# Patient Record
Sex: MALE | Race: WHITE | Employment: OTHER | URBAN - METROPOLITAN AREA
[De-identification: names, ages, dates, MRNs, and addresses within clinical notes are randomized per-mention and may not be internally consistent; named-entity substitution may affect disease eponyms.]

---

## 2018-03-19 ENCOUNTER — HOSPITAL ENCOUNTER (OUTPATIENT)
Dept: PREADMISSION TESTING | Age: 73
Discharge: HOME | End: 2018-03-19
Payer: MEDICARE

## 2018-03-19 VITALS
OXYGEN SATURATION: 97 % | HEIGHT: 72 IN | RESPIRATION RATE: 20 BRPM | WEIGHT: 186.38 LBS | SYSTOLIC BLOOD PRESSURE: 153 MMHG | HEART RATE: 58 BPM | TEMPERATURE: 98.3 F | BODY MASS INDEX: 25.24 KG/M2 | DIASTOLIC BLOOD PRESSURE: 84 MMHG

## 2018-03-19 LAB
ANION GAP SERPL CALC-SCNC: 7 MMOL/L (ref 5–15)
BASOPHILS # BLD: 0 K/UL (ref 0–0.1)
BASOPHILS NFR BLD: 0 % (ref 0–1)
BUN SERPL-MCNC: 16 MG/DL (ref 6–20)
BUN/CREAT SERPL: 20 (ref 12–20)
CALCIUM SERPL-MCNC: 9.6 MG/DL (ref 8.5–10.1)
CHLORIDE SERPL-SCNC: 104 MMOL/L (ref 97–108)
CO2 SERPL-SCNC: 33 MMOL/L (ref 21–32)
CREAT SERPL-MCNC: 0.81 MG/DL (ref 0.7–1.3)
DIFFERENTIAL METHOD BLD: NORMAL
EOSINOPHIL # BLD: 0.3 K/UL (ref 0–0.4)
EOSINOPHIL NFR BLD: 4 % (ref 0–7)
ERYTHROCYTE [DISTWIDTH] IN BLOOD BY AUTOMATED COUNT: 13.9 % (ref 11.5–14.5)
GLUCOSE SERPL-MCNC: 94 MG/DL (ref 65–100)
HCT VFR BLD AUTO: 43.4 % (ref 36.6–50.3)
HGB BLD-MCNC: 14.7 G/DL (ref 12.1–17)
IMM GRANULOCYTES # BLD: 0 K/UL (ref 0–0.04)
IMM GRANULOCYTES NFR BLD AUTO: 0 % (ref 0–0.5)
LYMPHOCYTES # BLD: 2.4 K/UL (ref 0.8–3.5)
LYMPHOCYTES NFR BLD: 35 % (ref 12–49)
MCH RBC QN AUTO: 32.9 PG (ref 26–34)
MCHC RBC AUTO-ENTMCNC: 33.9 G/DL (ref 30–36.5)
MCV RBC AUTO: 97.1 FL (ref 80–99)
MONOCYTES # BLD: 0.7 K/UL (ref 0–1)
MONOCYTES NFR BLD: 10 % (ref 5–13)
NEUTS SEG # BLD: 3.6 K/UL (ref 1.8–8)
NEUTS SEG NFR BLD: 51 % (ref 32–75)
NRBC # BLD: 0 K/UL (ref 0–0.01)
NRBC BLD-RTO: 0 PER 100 WBC
PLATELET # BLD AUTO: 215 K/UL (ref 150–400)
PMV BLD AUTO: 10 FL (ref 8.9–12.9)
POTASSIUM SERPL-SCNC: 4.3 MMOL/L (ref 3.5–5.1)
RBC # BLD AUTO: 4.47 M/UL (ref 4.1–5.7)
SODIUM SERPL-SCNC: 144 MMOL/L (ref 136–145)
WBC # BLD AUTO: 7 K/UL (ref 4.1–11.1)

## 2018-03-19 PROCEDURE — 80048 BASIC METABOLIC PNL TOTAL CA: CPT | Performed by: SURGERY

## 2018-03-19 PROCEDURE — 36415 COLL VENOUS BLD VENIPUNCTURE: CPT | Performed by: SURGERY

## 2018-03-19 PROCEDURE — 93005 ELECTROCARDIOGRAM TRACING: CPT

## 2018-03-19 PROCEDURE — 85025 COMPLETE CBC W/AUTO DIFF WBC: CPT | Performed by: SURGERY

## 2018-03-19 RX ORDER — ALENDRONATE SODIUM 35 MG/1
35 TABLET ORAL
COMMUNITY

## 2018-03-19 RX ORDER — GLUCOSAMINE SULFATE 1500 MG
POWDER IN PACKET (EA) ORAL DAILY
COMMUNITY
End: 2018-03-19

## 2018-03-19 RX ORDER — CALCIUM CARBONATE/VITAMIN D3 500 MG-10
TABLET ORAL DAILY
COMMUNITY

## 2018-03-19 RX ORDER — NEBIVOLOL 20 MG/1
TABLET ORAL DAILY
COMMUNITY

## 2018-03-19 RX ORDER — LANOLIN ALCOHOL/MO/W.PET/CERES
1000 CREAM (GRAM) TOPICAL DAILY
COMMUNITY
End: 2018-03-19

## 2018-03-19 RX ORDER — CYANOCOBALAMIN 1000 UG/ML
1000 INJECTION, SOLUTION INTRAMUSCULAR; SUBCUTANEOUS ONCE
COMMUNITY

## 2018-03-19 NOTE — PERIOP NOTES
[facility]  Annmarie Gaines 75, [address]    MAIN OR [phone]    MAIN PRE OP [phone]    AMBULATORY PRE OP [phone]    PRE-ADMISSION TESTING [phone]       Surgery Date:          Is surgery arrival time given by surgeon? NO  If NO, [facility] staff will call you between 3 and 7pm the day before your surgery with your arrival time. (If your surgery is on a Monday, we will call you the Friday before.)    Call [phone] after 7pm Monday-Friday if you did not receive your arrival time.     Answers to Common Questions   When You  Arrive   Arrive at the 2nd [address] [facility] on the day of your surgery  Have your insurance card, photo ID, and any copayment (if needed)     Food   and   Drink   NO food or drink after midnight the night before surgery    This means NO water, gum, mints, coffee, juice, etc.  No alcohol (beer, wine, liquor) 24 hours before and after surgery     Medicine to   TAKE   Morning of Surgery   MEDICATIONS TO TAKE THE MORNING OF SURGERY WITH A SIP OF WATER:    Fosamax, Omeprazole, Bystolic     Medicine  To  STOP   FOR PAIN   NO Aspirin for pain    NO Non-Steroidal Anti-Inflammatory Drugs (NSAIDs:   for example, Ibuprofen (Advil, Motrin), Naproxen (Aleve)   STOP herbal supplements and vitamins 1 week before surgery   You can take Tylenol  follow instructions on the bottle     Blood  Thinners    If you take Aspirin, Plavix, Coumadin, blood-thinning or anti-clot medicine, talk to your surgeon and/or follow the instructions from the doctor who told you to take that medicine     Clothing  Jewelry  Valuables  Bathing     CLOTHING   Wear loose, comfortable clothes   Wear glasses instead of contacts   Leave money, jewelry and valuables at home   No make-up, particularly mascara, the day of surgery   REMOVE ALL piercings, rings, and jewelry - leave at home   Wear hair loose or down; no pony-tails, buns, or metal hair clips    BATHING   Follow all special bath instructions (for total joint replacement, spine and bowel surgeries.)   If you shower the morning of surgery, please do not apply any lotions, powders, or deodorants afterwards. Do not shave or trim anywhere 24 hours before surgery. Going Home  or  Spending the Night    SAME-DAY SURGERY: You must have a responsible adult drive you home and stay with you 24 hours after surgery   ADMITS: If your doctor is keeping you into the hospital after surgery, leave personal belongings/luggage in your car until you have a hospital room number. Hospital discharge time is 12 noon  Drivers must be here before 12 noon unless you are told differently         Follow all instructions so your surgery wont be cancelled. Please, be on time. If a situation occurs and you are delayed the day of surgery, call [phone] or [phone]. If your physical condition changes (like a fever, cold, flu, etc.) call your surgeon as soon as possible. The Preadmission Testing staff can be reached at [phone]. OTHER SPECIAL INSTRUCTIONS:  Free  Parking    The patient was contacted  in person. He  verbalize  understanding of all instructions and does not  need reinforcement.

## 2018-03-20 LAB
ATRIAL RATE: 54 BPM
CALCULATED P AXIS, ECG09: 73 DEGREES
CALCULATED R AXIS, ECG10: -44 DEGREES
CALCULATED T AXIS, ECG11: 24 DEGREES
DIAGNOSIS, 93000: NORMAL
P-R INTERVAL, ECG05: 206 MS
Q-T INTERVAL, ECG07: 426 MS
QRS DURATION, ECG06: 106 MS
QTC CALCULATION (BEZET), ECG08: 403 MS
VENTRICULAR RATE, ECG03: 54 BPM

## 2018-03-21 ENCOUNTER — ANESTHESIA EVENT (OUTPATIENT)
Dept: SURGERY | Age: 73
End: 2018-03-21
Payer: MEDICARE

## 2018-03-21 RX ORDER — SODIUM CHLORIDE, SODIUM LACTATE, POTASSIUM CHLORIDE, CALCIUM CHLORIDE 600; 310; 30; 20 MG/100ML; MG/100ML; MG/100ML; MG/100ML
125 INJECTION, SOLUTION INTRAVENOUS CONTINUOUS
Status: CANCELLED | OUTPATIENT
Start: 2018-03-21

## 2018-03-21 RX ORDER — HYDROMORPHONE HYDROCHLORIDE 1 MG/ML
.25-1 INJECTION, SOLUTION INTRAMUSCULAR; INTRAVENOUS; SUBCUTANEOUS
Status: CANCELLED | OUTPATIENT
Start: 2018-03-21

## 2018-03-21 RX ORDER — DIPHENHYDRAMINE HYDROCHLORIDE 50 MG/ML
12.5 INJECTION, SOLUTION INTRAMUSCULAR; INTRAVENOUS AS NEEDED
Status: CANCELLED | OUTPATIENT
Start: 2018-03-21 | End: 2018-03-21

## 2018-03-21 RX ORDER — SODIUM CHLORIDE 0.9 % (FLUSH) 0.9 %
5-10 SYRINGE (ML) INJECTION AS NEEDED
Status: CANCELLED | OUTPATIENT
Start: 2018-03-21

## 2018-03-22 ENCOUNTER — HOSPITAL ENCOUNTER (OUTPATIENT)
Age: 73
Setting detail: OUTPATIENT SURGERY
Discharge: HOME | End: 2018-03-22
Attending: SURGERY | Admitting: SURGERY
Payer: MEDICARE

## 2018-03-22 ENCOUNTER — ANESTHESIA (OUTPATIENT)
Dept: SURGERY | Age: 73
End: 2018-03-22
Payer: MEDICARE

## 2018-03-22 VITALS
TEMPERATURE: 99 F | HEART RATE: 62 BPM | DIASTOLIC BLOOD PRESSURE: 90 MMHG | OXYGEN SATURATION: 99 % | SYSTOLIC BLOOD PRESSURE: 146 MMHG | RESPIRATION RATE: 18 BRPM

## 2018-03-22 PROCEDURE — 74011250636 HC RX REV CODE- 250/636: Performed by: ANESTHESIOLOGY

## 2018-03-22 PROCEDURE — 77030020782 HC GWN BAIR PAWS FLX 3M -B

## 2018-03-22 RX ORDER — SODIUM CHLORIDE 0.9 % (FLUSH) 0.9 %
5-10 SYRINGE (ML) INJECTION AS NEEDED
Status: DISCONTINUED | OUTPATIENT
Start: 2018-03-22 | End: 2018-03-22 | Stop reason: HOSPADM

## 2018-03-22 RX ORDER — CEFAZOLIN SODIUM/WATER 2 G/20 ML
2 SYRINGE (ML) INTRAVENOUS ONCE
Status: DISCONTINUED | OUTPATIENT
Start: 2018-03-22 | End: 2018-03-22 | Stop reason: HOSPADM

## 2018-03-22 RX ORDER — ACETAMINOPHEN 325 MG/1
650 TABLET ORAL
COMMUNITY
End: 2018-03-23

## 2018-03-22 RX ORDER — FLUMAZENIL 0.1 MG/ML
0.2 INJECTION INTRAVENOUS
Status: DISCONTINUED | OUTPATIENT
Start: 2018-03-22 | End: 2018-03-22 | Stop reason: HOSPADM

## 2018-03-22 RX ORDER — SODIUM CHLORIDE, SODIUM LACTATE, POTASSIUM CHLORIDE, CALCIUM CHLORIDE 600; 310; 30; 20 MG/100ML; MG/100ML; MG/100ML; MG/100ML
125 INJECTION, SOLUTION INTRAVENOUS CONTINUOUS
Status: DISCONTINUED | OUTPATIENT
Start: 2018-03-22 | End: 2018-03-22 | Stop reason: HOSPADM

## 2018-03-22 RX ORDER — SODIUM CHLORIDE 0.9 % (FLUSH) 0.9 %
5-10 SYRINGE (ML) INJECTION EVERY 8 HOURS
Status: DISCONTINUED | OUTPATIENT
Start: 2018-03-22 | End: 2018-03-22 | Stop reason: HOSPADM

## 2018-03-22 RX ORDER — NALOXONE HYDROCHLORIDE 0.4 MG/ML
0.2 INJECTION, SOLUTION INTRAMUSCULAR; INTRAVENOUS; SUBCUTANEOUS
Status: DISCONTINUED | OUTPATIENT
Start: 2018-03-22 | End: 2018-03-22 | Stop reason: HOSPADM

## 2018-03-22 RX ORDER — LIDOCAINE HYDROCHLORIDE 10 MG/ML
0.1 INJECTION, SOLUTION EPIDURAL; INFILTRATION; INTRACAUDAL; PERINEURAL AS NEEDED
Status: DISCONTINUED | OUTPATIENT
Start: 2018-03-22 | End: 2018-03-22 | Stop reason: HOSPADM

## 2018-03-22 RX ADMIN — SODIUM CHLORIDE, SODIUM LACTATE, POTASSIUM CHLORIDE, AND CALCIUM CHLORIDE 125 ML/HR: 600; 310; 30; 20 INJECTION, SOLUTION INTRAVENOUS at 12:22

## 2018-03-22 NOTE — ANESTHESIA PREPROCEDURE EVALUATION
Anesthetic History   No history of anesthetic complications            Review of Systems / Medical History  Patient summary reviewed, nursing notes reviewed and pertinent labs reviewed    Pulmonary  Within defined limits                 Neuro/Psych   Within defined limits           Cardiovascular    Hypertension              Exercise tolerance: >4 METS  Comments: nebivolol (BYSTOLIC) 20 mg tablet   5/09/6312 at 0630       GI/Hepatic/Renal     GERD           Endo/Other        Cancer (colon)     Other Findings            Physical Exam    Airway  Mallampati: II  TM Distance: 4 - 6 cm  Neck ROM: normal range of motion   Mouth opening: Normal     Cardiovascular  Regular rate and rhythm,  S1 and S2 normal,  no murmur, click, rub, or gallop  Rhythm: regular  Rate: normal         Dental    Dentition: Full upper dentures     Pulmonary  Breath sounds clear to auscultation               Abdominal  GI exam deferred       Other Findings            Anesthetic Plan    ASA: 2  Anesthesia type: general          Induction: Intravenous  Anesthetic plan and risks discussed with: Patient

## 2018-03-22 NOTE — PERIOP NOTES
Pt. Cancelled per Dr. Daina Chau and added on for tomorrow due to long emergent case. Pt. Given instructions, time of arrival and NPO status.

## 2018-03-23 ENCOUNTER — HOSPITAL ENCOUNTER (OUTPATIENT)
Age: 73
Setting detail: OUTPATIENT SURGERY
Discharge: HOME | End: 2018-03-23
Attending: SURGERY | Admitting: SURGERY
Payer: MEDICARE

## 2018-03-23 VITALS
HEART RATE: 75 BPM | TEMPERATURE: 98 F | OXYGEN SATURATION: 98 % | WEIGHT: 186.29 LBS | RESPIRATION RATE: 16 BRPM | SYSTOLIC BLOOD PRESSURE: 165 MMHG | BODY MASS INDEX: 25.23 KG/M2 | HEIGHT: 72 IN | DIASTOLIC BLOOD PRESSURE: 86 MMHG

## 2018-03-23 DIAGNOSIS — K40.91 RECURRENT RIGHT INGUINAL HERNIA: Primary | ICD-10-CM

## 2018-03-23 PROCEDURE — 76210000020 HC REC RM PH II FIRST 0.5 HR: Performed by: SURGERY

## 2018-03-23 PROCEDURE — 74011250636 HC RX REV CODE- 250/636: Performed by: ANESTHESIOLOGY

## 2018-03-23 PROCEDURE — 76210000006 HC OR PH I REC 0.5 TO 1 HR: Performed by: SURGERY

## 2018-03-23 PROCEDURE — 77030002966 HC SUT PDS J&J -A: Performed by: SURGERY

## 2018-03-23 PROCEDURE — 74011250636 HC RX REV CODE- 250/636

## 2018-03-23 PROCEDURE — 74011000250 HC RX REV CODE- 250

## 2018-03-23 PROCEDURE — 74011000250 HC RX REV CODE- 250: Performed by: SURGERY

## 2018-03-23 PROCEDURE — 77030020782 HC GWN BAIR PAWS FLX 3M -B

## 2018-03-23 PROCEDURE — 77030031139 HC SUT VCRL2 J&J -A: Performed by: SURGERY

## 2018-03-23 PROCEDURE — 76010000153 HC OR TIME 1.5 TO 2 HR: Performed by: SURGERY

## 2018-03-23 PROCEDURE — 77030011640 HC PAD GRND REM COVD -A: Performed by: SURGERY

## 2018-03-23 PROCEDURE — 77030020143 HC AIRWY LARYN INTUB CGAS -A: Performed by: ANESTHESIOLOGY

## 2018-03-23 PROCEDURE — C1781 MESH (IMPLANTABLE): HCPCS | Performed by: SURGERY

## 2018-03-23 PROCEDURE — 76060000034 HC ANESTHESIA 1.5 TO 2 HR: Performed by: SURGERY

## 2018-03-23 PROCEDURE — 77030012406 HC DRN WND PENRS BARD -A: Performed by: SURGERY

## 2018-03-23 PROCEDURE — 74011250636 HC RX REV CODE- 250/636: Performed by: SURGERY

## 2018-03-23 PROCEDURE — 77030010507 HC ADH SKN DERMBND J&J -B: Performed by: SURGERY

## 2018-03-23 PROCEDURE — 77030018836 HC SOL IRR NACL ICUM -A: Performed by: SURGERY

## 2018-03-23 PROCEDURE — 77030032490 HC SLV COMPR SCD KNE COVD -B: Performed by: SURGERY

## 2018-03-23 PROCEDURE — 77030002933 HC SUT MCRYL J&J -A: Performed by: SURGERY

## 2018-03-23 PROCEDURE — C9290 INJ, BUPIVACAINE LIPOSOME: HCPCS | Performed by: SURGERY

## 2018-03-23 DEVICE — MESH HERN W7.5XL15CM INGUINAL POLYPR SYN FLAT L PORE MFIL: Type: IMPLANTABLE DEVICE | Site: GROIN | Status: FUNCTIONAL

## 2018-03-23 RX ORDER — SODIUM CHLORIDE, SODIUM LACTATE, POTASSIUM CHLORIDE, CALCIUM CHLORIDE 600; 310; 30; 20 MG/100ML; MG/100ML; MG/100ML; MG/100ML
125 INJECTION, SOLUTION INTRAVENOUS CONTINUOUS
Status: DISCONTINUED | OUTPATIENT
Start: 2018-03-23 | End: 2018-03-23 | Stop reason: HOSPADM

## 2018-03-23 RX ORDER — MIDAZOLAM HYDROCHLORIDE 1 MG/ML
INJECTION, SOLUTION INTRAMUSCULAR; INTRAVENOUS AS NEEDED
Status: DISCONTINUED | OUTPATIENT
Start: 2018-03-23 | End: 2018-03-23 | Stop reason: HOSPADM

## 2018-03-23 RX ORDER — LIDOCAINE HYDROCHLORIDE 20 MG/ML
INJECTION, SOLUTION EPIDURAL; INFILTRATION; INTRACAUDAL; PERINEURAL AS NEEDED
Status: DISCONTINUED | OUTPATIENT
Start: 2018-03-23 | End: 2018-03-23 | Stop reason: HOSPADM

## 2018-03-23 RX ORDER — HYDROMORPHONE HYDROCHLORIDE 1 MG/ML
.25-1 INJECTION, SOLUTION INTRAMUSCULAR; INTRAVENOUS; SUBCUTANEOUS
Status: DISCONTINUED | OUTPATIENT
Start: 2018-03-23 | End: 2018-03-23 | Stop reason: HOSPADM

## 2018-03-23 RX ORDER — EPHEDRINE SULFATE 50 MG/ML
INJECTION, SOLUTION INTRAVENOUS AS NEEDED
Status: DISCONTINUED | OUTPATIENT
Start: 2018-03-23 | End: 2018-03-23 | Stop reason: HOSPADM

## 2018-03-23 RX ORDER — DEXAMETHASONE SODIUM PHOSPHATE 4 MG/ML
INJECTION, SOLUTION INTRA-ARTICULAR; INTRALESIONAL; INTRAMUSCULAR; INTRAVENOUS; SOFT TISSUE AS NEEDED
Status: DISCONTINUED | OUTPATIENT
Start: 2018-03-23 | End: 2018-03-23 | Stop reason: HOSPADM

## 2018-03-23 RX ORDER — CEFAZOLIN SODIUM/WATER 2 G/20 ML
2 SYRINGE (ML) INTRAVENOUS ONCE
Status: COMPLETED | OUTPATIENT
Start: 2018-03-23 | End: 2018-03-23

## 2018-03-23 RX ORDER — SODIUM CHLORIDE 0.9 % (FLUSH) 0.9 %
5-10 SYRINGE (ML) INJECTION AS NEEDED
Status: DISCONTINUED | OUTPATIENT
Start: 2018-03-23 | End: 2018-03-23 | Stop reason: HOSPADM

## 2018-03-23 RX ORDER — PROPOFOL 10 MG/ML
INJECTION, EMULSION INTRAVENOUS AS NEEDED
Status: DISCONTINUED | OUTPATIENT
Start: 2018-03-23 | End: 2018-03-23 | Stop reason: HOSPADM

## 2018-03-23 RX ORDER — OXYCODONE AND ACETAMINOPHEN 5; 325 MG/1; MG/1
1-2 TABLET ORAL
Qty: 25 TAB | Refills: 0 | Status: SHIPPED | OUTPATIENT
Start: 2018-03-23

## 2018-03-23 RX ORDER — DIPHENHYDRAMINE HYDROCHLORIDE 50 MG/ML
12.5 INJECTION, SOLUTION INTRAMUSCULAR; INTRAVENOUS AS NEEDED
Status: DISCONTINUED | OUTPATIENT
Start: 2018-03-23 | End: 2018-03-23 | Stop reason: HOSPADM

## 2018-03-23 RX ORDER — ONDANSETRON 2 MG/ML
INJECTION INTRAMUSCULAR; INTRAVENOUS AS NEEDED
Status: DISCONTINUED | OUTPATIENT
Start: 2018-03-23 | End: 2018-03-23 | Stop reason: HOSPADM

## 2018-03-23 RX ORDER — FENTANYL CITRATE 50 UG/ML
INJECTION, SOLUTION INTRAMUSCULAR; INTRAVENOUS AS NEEDED
Status: DISCONTINUED | OUTPATIENT
Start: 2018-03-23 | End: 2018-03-23 | Stop reason: HOSPADM

## 2018-03-23 RX ADMIN — EPHEDRINE SULFATE 5 MG: 50 INJECTION, SOLUTION INTRAVENOUS at 17:48

## 2018-03-23 RX ADMIN — FENTANYL CITRATE 50 MCG: 50 INJECTION, SOLUTION INTRAMUSCULAR; INTRAVENOUS at 17:30

## 2018-03-23 RX ADMIN — LIDOCAINE HYDROCHLORIDE 50 MG: 20 INJECTION, SOLUTION EPIDURAL; INFILTRATION; INTRACAUDAL; PERINEURAL at 17:15

## 2018-03-23 RX ADMIN — FENTANYL CITRATE 25 MCG: 50 INJECTION, SOLUTION INTRAMUSCULAR; INTRAVENOUS at 18:00

## 2018-03-23 RX ADMIN — SODIUM CHLORIDE, SODIUM LACTATE, POTASSIUM CHLORIDE, AND CALCIUM CHLORIDE: 600; 310; 30; 20 INJECTION, SOLUTION INTRAVENOUS at 18:06

## 2018-03-23 RX ADMIN — Medication 2 G: at 17:21

## 2018-03-23 RX ADMIN — MIDAZOLAM HYDROCHLORIDE 2 MG: 1 INJECTION, SOLUTION INTRAMUSCULAR; INTRAVENOUS at 17:10

## 2018-03-23 RX ADMIN — FENTANYL CITRATE 50 MCG: 50 INJECTION, SOLUTION INTRAMUSCULAR; INTRAVENOUS at 17:15

## 2018-03-23 RX ADMIN — ONDANSETRON 4 MG: 2 INJECTION INTRAMUSCULAR; INTRAVENOUS at 17:23

## 2018-03-23 RX ADMIN — EPHEDRINE SULFATE 15 MG: 50 INJECTION, SOLUTION INTRAVENOUS at 17:38

## 2018-03-23 RX ADMIN — PROPOFOL 120 MG: 10 INJECTION, EMULSION INTRAVENOUS at 17:15

## 2018-03-23 RX ADMIN — FENTANYL CITRATE 25 MCG: 50 INJECTION, SOLUTION INTRAMUSCULAR; INTRAVENOUS at 18:18

## 2018-03-23 RX ADMIN — SODIUM CHLORIDE, SODIUM LACTATE, POTASSIUM CHLORIDE, AND CALCIUM CHLORIDE 125 ML/HR: 600; 310; 30; 20 INJECTION, SOLUTION INTRAVENOUS at 13:43

## 2018-03-23 RX ADMIN — DEXAMETHASONE SODIUM PHOSPHATE 4 MG: 4 INJECTION, SOLUTION INTRA-ARTICULAR; INTRALESIONAL; INTRAMUSCULAR; INTRAVENOUS; SOFT TISSUE at 17:24

## 2018-03-23 NOTE — PERIOP NOTES
Reviewed DC instructions with pt and wife, both verbalize understanding. Pen pad to sign instructions not working.  Pt taking po without diff; denies c/o pain, No NV

## 2018-03-23 NOTE — IP AVS SNAPSHOT
[facility] 
 
 
 [address] 
[phone] Patient: Asif Mccullough MRN: [id number] VBW:[id number] About your hospitalization You were admitted on:  March 23, 2018 You last received care in the:  [facility] You were discharged on:  March 23, 2018 Why you were hospitalized Your primary diagnosis was:  Not on File Follow-up Information Follow up With Details Comments Contact Info Sharita Zhu MD   Patient can only remember the practice name and not the physician Faith Muhammad MD In 2 weeks  [address] 
[phone] Discharge Orders None A check alana indicates which time of day the medication should be taken. My Medications START taking these medications Instructions Each Dose to Equal  
 Morning Noon Evening Bedtime  
 oxyCODONE-acetaminophen 5-325 mg per tablet Commonly known as:  PERCOCET Your last dose was: Your next dose is: Take 1-2 Tabs by mouth every four (4) hours as needed for Pain. Max Daily Amount: 12 Tabs. 1-2 Tab CONTINUE taking these medications Instructions Each Dose to Equal  
 Morning Noon Evening Bedtime  
 alendronate 35 mg tablet Commonly known as:  FOSAMAX Your last dose was: Your next dose is: Take 35 mg by mouth every seven (7) days. 35 mg  
    
   
   
   
  
 BYSTOLIC 20 mg tablet Generic drug:  nebivolol Your last dose was: Your next dose is: Take  by mouth daily. Calcium-Cholecalciferol (D3) 500 mg(1,250mg) -400 unit Tab Your last dose was: Your next dose is: Take  by mouth daily. DIOVAN -12.5 mg per tablet Generic drug:  valsartan-hydroCHLOROthiazide Your last dose was: Your next dose is: Take 1 Tab by mouth daily. 1 Tab LUPRON DEPOT (3 MONTH) IM Your last dose was: Your next dose is:    
   
   
 by IntraMUSCular route. PriLOSEC OTC 20 mg tablet Generic drug:  omeprazole Your last dose was: Your next dose is: Take 20 mg by mouth daily. 20 mg  
    
   
   
   
  
 VITAMIN B-12 1,000 mcg/mL injection Generic drug:  cyanocobalamin Your last dose was: Your next dose is:    
   
   
 1,000 mcg by IntraMUSCular route once. Takes once per month 1000 mcg STOP taking these medications TYLENOL 325 mg tablet Generic drug:  acetaminophen Where to Get Your Medications Information on where to get these meds will be given to you by the nurse or doctor. ! Ask your nurse or doctor about these medications  
  oxyCODONE-acetaminophen 5-325 mg per tablet Discharge Instructions PATIENT INSTRUCTIONS HERNIA 
 
FOLLOW-UP:  Please make an appointment with your physician in 2 week(s). Call your physician immediately if you have any fevers greater than 102.5, drainage from you wound that is not clear or looks infected, persistent bleeding, increasing abdominal pain, problems urinating, or persistent nausea/vomiting. WOUND CARE INSTRUCTIONS:  Dermabond liquid skin bandage applied. You may shower today. Do not scrape off. Apply ice pack to the incision, 15 minutes on, 15 minutes off. This will reduce swelling and pain. If clothing rubs against the wound or causes irritation and the wound is not draining you may cover it with a dry dressing during the daytime. Try to keep the wound dry and avoid ointments on the wound unless directed to do so.   If the wound becomes bright red and painful or starts to drain infected material that is not clear, please contact your physician immediately. If the wound is mildly pink and has a thick firm ridge underneath it, this is normal, and is referred to as a healing ridge. This will resolve over the next 4-6 weeks. DIET:  You may eat any foods that you can tolerate. It is a good idea to eat a high fiber diet and take in plenty of fluids to prevent constipation. If you do become constipated you may want to take a mild laxative or take ducolax tablets on a daily basis until your bowel habits are regular. Constipation can be very uncomfortable, along with straining, after recent abdominal surgery. ACTIVITY:  You are encouraged to cough and deep breath or use your incentive spirometer if you were given one, every 15-30 minutes when awake. This will help prevent respiratory complications and low grade fevers post-operatively. You may want to hug a pillow when coughing and sneezing to add additional support to the surgical area which will decrease pain during these times. You are encouraged to walk and engage in light activity for the next two weeks. You should not lift more than 20 pounds during this time frame as it could put you at increased risk for a hernia recurrence. Twenty pounds is roughly equivalent to a plastic bag of groceries. MEDICATIONS:  Try to take narcotic medications and anti-inflammatory medications, such as tylenol, ibuprofen, naprosyn, etc., with food. This will minimize stomach upset from the medication. Should you develop nausea and vomiting from the pain medication, or develop a rash, please discontinue the medication and contact your physician. You should not drive, make important decisions, or operate machinery when taking narcotic pain medication. QUESTIONS:  Please feel free to call your physician or the hospital  if you have any questions, and they will be glad to assist you. DISCHARGE SUMMARY from your Nurse The following personal items collected during your admission are returned to you:  
Dental Appliance: Dental Appliances: None Vision: Visual Aid: Glasses Hearing Aid: Hearing Aid: Bilateral, With patient Jewelry: Jewelry: None Clothing: Clothing:  (Clothing to locker) Other Valuables: Other Valuables: Eyeglasses, Other (comment) (Hearing aids given to wife) Valuables sent to safe:   
 
PATIENT INSTRUCTIONS: 
 
After general anesthesia or intravenous sedation, for 24 hours or while taking prescription Narcotics: · Limit your activities · Do not drive and operate hazardous machinery · Do not make important personal or business decisions · Do  not drink alcoholic beverages · If you have not urinated within 8 hours after discharge, please contact your surgeon on call. Report the following to your surgeon: 
· Excessive pain, swelling, redness or odor of or around the surgical area · Temperature over 100.5 · Nausea and vomiting lasting longer than 4 hours or if unable to take medications · Any signs of decreased circulation or nerve impairment to extremity: change in color, persistent  numbness, tingling, coldness or increase pain · Any questions [address] Breathing deep and coughing are very important exercises to do after surgery. Deep breathing and coughing open the little air tubes and air sacks in your lungs. You take deep breaths every day. You may not even notice - it is just something you do when you sigh or yawn. It is a natural exercise you do to keep these air passages open. After surgery, take deep breaths and cough, on purpose. Coughing and deep breathing help prevent bronchitis and pneumonia after surgery. If you had chest or belly surgery, use a pillow as a \"hug buddy\" and hold it tightly to your chest or belly when you cough. DIRECTIONS: 
6. Take 10 to 15 slow deep breaths every hour while awake. 7. Breathe in deeply, and hold it for 2 seconds. 8. Exhale slowly through puckered lips, like blowing up a balloon. 9. After every 4th or 5th deep breath, hug your pillow to your chest or belly and give a hard, deep cough. Yes, it will probably hurt. But doing this exercise is very important part of healing after surgery. Take your pain medicine to help you do this exercise without too much pain. IF YOU HAVE BEEN DIAGNOSED WITH SLEEP APNEA, PLEASE USE YOUR SLEEP APNEA DEVICE OR CPAP MACHINE WHEN YOU INTEND TO NAP AFTER TAKING PAIN MEDICATION. Ankle Pumps Ankle pumps increase the circulation of oxygenated blood to your lower extremities and decrease your risk for circulation problems such as blood clots. They also stretch the muscles, tendons and ligaments in your foot and ankle, and prevent joint contracture in the ankle and foot, especially after surgeries on the legs. It is important to do ankle pump exercises regularly after surgery because immobility increases your risk for developing a blood clot. Your doctor may also have you take an Aspirin for the next few days as well. If your doctor did not ask you to take an Aspirin, consult with him before starting Aspirin therapy on your own. Slowly point your foot forward, feeling the muscles on the top of your lower leg stretch, and hold this position for 5 seconds. Next, pull your foot back toward you as far as possible, stretching the calf muscles, and hold that position for 5 seconds. Repeat with the other foot. Perform 10 repetitions every hour while awake for both ankles if possible (down and then up with the foot once is one repetition). You should feel gentle stretching of the muscles in your lower leg when doing this exercise. If you feel pain, or your range of motion is limited, don't  Push too hard. Only go the limit your joint and muscles will let you go. If you have increasing pain, progressively worsening leg warmth or swelling, STOP the exercise and call your doctor. Below is information about the medications your doctor is prescribing after your visit: 
 
Other information in your discharge envelope: 
[]     PRESCRIPTIONS []     PHYSICAL THERAPY PRESCRIPTION 
[]     APPOINTMENT CARDS []     [facility] Pamphlet for block or block with On-Q Catheter from Anesthesia Service []     Medical device information sheets/pamphlets from their   
[]     School/work excuse note. []     /parent work excuse note. These are general instructions for a healthy lifestyle: *  Please give a list of your current medications to your Primary Care Provider. *  Please update this list whenever your medications are discontinued, doses are 
    changed, or new medications (including over-the-counter products) are added. *  Please carry medication information at all times in case of emergency situations. About Smoking No smoking / No tobacco products / Avoid exposure to second hand smoke Surgeon General's Warning:  Quitting smoking now greatly reduces serious risk to your health. Obesity, smoking, and sedentary lifestyle greatly increases your risk for illness and disease. A healthy diet, regular physical exercise & weight monitoring are important for maintaining a healthy lifestyle. Congestive Heart Failure You may be retaining fluid if you have a history of heart failure or if you experience any of the following symptoms:  Weight gain of 3 pounds or more overnight or 5 pounds in a week, increased swelling in our hands or feet or shortness of breath while lying flat in bed. Please call your doctor as soon as you notice any of these symptoms; do not wait until your next office visit. Recognize signs and symptoms of STROKE: 
F - face looks uneven A - arms unable to move or move even S - speech slurred or non-existent T - time-call 911 as soon as signs and symptoms begin-DO NOT go  
 Back to bed or wait to see if you get better-TIME IS BRAIN. Warning signs of HEART ATTACK Call 911 if you have these symptoms · Chest discomfort. Most heart attacks involve discomfort in the center of the chest that lasts more than a few minutes, or that goes away and comes back. It can feel like uncomfortable pressure, squeezing, fullness, or pain. · Discomfort in other areas of the upper body. Symptoms can include pain or discomfort in one or both Arms, the back, neck, jaw, or stomach. ·  Shortness of breath with or without chest discomfort. · Other signs may include breaking out in a cold sweat, nausea, or lightheadedness Don't wait more than five minutes to call 911 - MINUTES MATTER! Fast action can save your life. Calling 911 is almost always the fastest way to get lifesaving treatment. Emergency Medical Services staff can begin treatment when they arrive - up to an hour sooner than if someone gets to the hospital by car. [facility] MEDICATION AND SIDE EFFECT GUIDE The [address] [facility] EFFECT GUIDE was provided to the PATIENT AND CARE PROVIDER. Information provided includes instruction about drug purpose and common side effects for the following medications: 
 
· Percocet Providers Seen During Your Hospitalization Provider Specialty Primary office phone Beena Marsh MD General Surgery [phone] Your Primary Care Physician (PCP) Primary Care Physician Office Phone Office Fax OTHER, PHYS ** None ** ** None ** You are allergic to the following No active allergies Recent Documentation Height Weight BMI Smoking Status 1.829 m 84.5 kg 25.27 kg/m2 Never Smoker Emergency Contacts Name Discharge Info Relation Home Work Mobile OluArin DISCHARGE CAREGIVER [3] Spouse [3] [phone] Patient Belongings The following personal items are in your possession at time of discharge: 
  Dental Appliances: None  Visual Aid: Glasses   Hearing Aids/Status: Functioning, With patient  Home Medications: None   Jewelry: None  Clothing:  (Clothing to locker)    Other Valuables: Eyeglasses, Other (comment) (Hearing aids given to wife) Please provide this summary of care documentation to your next provider. Signatures-by signing, you are acknowledging that this After Visit Summary has been reviewed with you and you have received a copy. Patient Signature:  ____________________________________________________________ Date:  ____________________________________________________________  
  
Mitchell Medico Provider Signature:  ____________________________________________________________ Date:  ____________________________________________________________

## 2018-03-23 NOTE — H&P
Surgical H&P Update    Patient seen and examined. No changes in health since clinic appointment. All questions regarding risk and benefits of surgical procedure answered. Consent for open repair of a recurrent right inguinal hernia with mesh obtained. Site marked.     Saige Sage MD  03/23/18

## 2018-03-23 NOTE — DISCHARGE INSTRUCTIONS
PATIENT INSTRUCTIONS  HERNIA    FOLLOW-UP:  Please make an appointment with your physician in 2 week(s). Call your physician immediately if you have any fevers greater than 102.5, drainage from you wound that is not clear or looks infected, persistent bleeding, increasing abdominal pain, problems urinating, or persistent nausea/vomiting. WOUND CARE INSTRUCTIONS:  Dermabond liquid skin bandage applied. You may shower today. Do not scrape off. Apply ice pack to the incision, 15 minutes on, 15 minutes off. This will reduce swelling and pain. If clothing rubs against the wound or causes irritation and the wound is not draining you may cover it with a dry dressing during the daytime. Try to keep the wound dry and avoid ointments on the wound unless directed to do so. If the wound becomes bright red and painful or starts to drain infected material that is not clear, please contact your physician immediately. If the wound is mildly pink and has a thick firm ridge underneath it, this is normal, and is referred to as a healing ridge. This will resolve over the next 4-6 weeks. DIET:  You may eat any foods that you can tolerate. It is a good idea to eat a high fiber diet and take in plenty of fluids to prevent constipation. If you do become constipated you may want to take a mild laxative or take ducolax tablets on a daily basis until your bowel habits are regular. Constipation can be very uncomfortable, along with straining, after recent abdominal surgery. ACTIVITY:  You are encouraged to cough and deep breath or use your incentive spirometer if you were given one, every 15-30 minutes when awake. This will help prevent respiratory complications and low grade fevers post-operatively. You may want to hug a pillow when coughing and sneezing to add additional support to the surgical area which will decrease pain during these times.   You are encouraged to walk and engage in light activity for the next two weeks.  You should not lift more than 20 pounds during this time frame as it could put you at increased risk for a hernia recurrence. Twenty pounds is roughly equivalent to a plastic bag of groceries. MEDICATIONS:  Try to take narcotic medications and anti-inflammatory medications, such as tylenol, ibuprofen, naprosyn, etc., with food. This will minimize stomach upset from the medication. Should you develop nausea and vomiting from the pain medication, or develop a rash, please discontinue the medication and contact your physician. You should not drive, make important decisions, or operate machinery when taking narcotic pain medication. QUESTIONS:  Please feel free to call your physician or the hospital  if you have any questions, and they will be glad to assist you. DISCHARGE SUMMARY from your Nurse    The following personal items collected during your admission are returned to you:   Dental Appliance: Dental Appliances: None  Vision: Visual Aid: Glasses  Hearing Aid: Hearing Aid: Bilateral, With patient  Jewelry: Jewelry: None  Clothing: Clothing:  (Clothing to locker)  Other Valuables: Other Valuables: Eyeglasses, Other (comment) (Hearing aids given to wife)  Valuables sent to safe:      PATIENT INSTRUCTIONS:    After general anesthesia or intravenous sedation, for 24 hours or while taking prescription Narcotics:  · Limit your activities  · Do not drive and operate hazardous machinery  · Do not make important personal or business decisions  · Do  not drink alcoholic beverages  · If you have not urinated within 8 hours after discharge, please contact your surgeon on call.     Report the following to your surgeon:  · Excessive pain, swelling, redness or odor of or around the surgical area  · Temperature over 100.5  · Nausea and vomiting lasting longer than 4 hours or if unable to take medications  · Any signs of decreased circulation or nerve impairment to extremity: change in color, persistent  numbness, tingling, coldness or increase pain  · Any questions    COUGH AND DEEP BREATHE    Breathing deep and coughing are very important exercises to do after surgery. Deep breathing and coughing open the little air tubes and air sacks in your lungs. You take deep breaths every day. You may not even notice - it is just something you do when you sigh or yawn. It is a natural exercise you do to keep these air passages open. After surgery, take deep breaths and cough, on purpose. Coughing and deep breathing help prevent bronchitis and pneumonia after surgery. If you had chest or belly surgery, use a pillow as a \"hug alexander\" and hold it tightly to your chest or belly when you cough. DIRECTIONS:  6. Take 10 to 15 slow deep breaths every hour while awake. 7. Breathe in deeply, and hold it for 2 seconds. 8. Exhale slowly through puckered lips, like blowing up a balloon. 9. After every 4th or 5th deep breath, hug your pillow to your chest or belly and give a hard, deep cough. Yes, it will probably hurt. But doing this exercise is very important part of healing after surgery. Take your pain medicine to help you do this exercise without too much pain. IF YOU HAVE BEEN DIAGNOSED WITH SLEEP APNEA, PLEASE USE YOUR SLEEP APNEA DEVICE OR CPAP MACHINE WHEN YOU INTEND TO NAP AFTER TAKING PAIN MEDICATION. Ankle Pumps    Ankle pumps increase the circulation of oxygenated blood to your lower extremities and decrease your risk for circulation problems such as blood clots. They also stretch the muscles, tendons and ligaments in your foot and ankle, and prevent joint contracture in the ankle and foot, especially after surgeries on the legs. It is important to do ankle pump exercises regularly after surgery because immobility increases your risk for developing a blood clot. Your doctor may also have you take an Aspirin for the next few days as well.     If your doctor did not ask you to take an Aspirin, consult with him before starting Aspirin therapy on your own. Slowly point your foot forward, feeling the muscles on the top of your lower leg stretch, and hold this position for 5 seconds. Next, pull your foot back toward you as far as possible, stretching the calf muscles, and hold that position for 5 seconds. Repeat with the other foot. Perform 10 repetitions every hour while awake for both ankles if possible (down and then up with the foot once is one repetition). You should feel gentle stretching of the muscles in your lower leg when doing this exercise. If you feel pain, or your range of motion is limited, don't  Push too hard. Only go the limit your joint and muscles will let you go. If you have increasing pain, progressively worsening leg warmth or swelling, STOP the exercise and call your doctor. Below is information about the medications your doctor is prescribing after your visit:    Other information in your discharge envelope:  []     PRESCRIPTIONS  []     PHYSICAL THERAPY PRESCRIPTION  []     APPOINTMENT CARDS  []     Regional Anesthesia Pamphlet for block or block with On-Q Catheter from Anesthesia Service  []     Medical device information sheets/pamphlets from their    []     School/work excuse note. []     /parent work excuse note. These are general instructions for a healthy lifestyle:    *  Please give a list of your current medications to your Primary Care Provider. *  Please update this list whenever your medications are discontinued, doses are      changed, or new medications (including over-the-counter products) are added. *  Please carry medication information at all times in case of emergency situations. About Smoking  No smoking / No tobacco products / Avoid exposure to second hand smoke    Surgeon General's Warning:  Quitting smoking now greatly reduces serious risk to your health.     Obesity, smoking, and sedentary lifestyle greatly increases your risk for illness and disease. A healthy diet, regular physical exercise & weight monitoring are important for maintaining a healthy lifestyle. Congestive Heart Failure  You may be retaining fluid if you have a history of heart failure or if you experience any of the following symptoms:  Weight gain of 3 pounds or more overnight or 5 pounds in a week, increased swelling in our hands or feet or shortness of breath while lying flat in bed. Please call your doctor as soon as you notice any of these symptoms; do not wait until your next office visit. Recognize signs and symptoms of STROKE:  F - face looks uneven  A - arms unable to move or move even  S - speech slurred or non-existent  T - time-call 911 as soon as signs and symptoms begin-DO NOT go         Back to bed or wait to see if you get better-TIME IS BRAIN. Warning signs of HEART ATTACK  Call 911 if you have these symptoms    · Chest discomfort. Most heart attacks involve discomfort in the center of the chest that lasts more than a few minutes, or that goes away and comes back. It can feel like uncomfortable pressure, squeezing, fullness, or pain. · Discomfort in other areas of the upper body. Symptoms can include pain or discomfort in one or both        Arms, the back, neck, jaw, or stomach. ·  Shortness of breath with or without chest discomfort. · Other signs may include breaking out in a cold sweat, nausea, or lightheadedness    Don't wait more than five minutes to call 911 - MINUTES MATTER! Fast action can save your life. Calling 911 is almost always the fastest way to get lifesaving treatment. Emergency Medical Services staff can begin treatment when they arrive - up to an hour sooner than if someone gets to the hospital by car. [facility] MEDICATION AND SIDE EFFECT GUIDE    The [employer] MEDICATION AND SIDE EFFECT GUIDE was provided to the PATIENT AND CARE PROVIDER. Information provided includes instruction about drug purpose and common side effects for the following medications:    · Percocet

## 2018-03-23 NOTE — OP NOTES
Date: 03/23/18     Pre-operative Diagnosis: Recurrent right inguinal hernia     Post-operative diagnosis: Recurrent right indirect inguinal hernia     Procedure: Open repair of recurrent right inguinal hernia with mesh.     Surgeon: Thomas Cortes MD     Surgical Assistant: Sanjay Rolon     Anesthesia: GLMA     Estimated Blood Loss: 5 cc cc     Findings: Recurrent right indirect inguinal hernia.     Specimen: None    Indication: 67year-old male with a recurrent right inguinal hernia     Description of Procedure: After consent was obtained, the patient was taken back to the operating room and placed in a supine position. A time out was completed verifying correct patient, procedure, site, positioning and any special equipment needs. After induction of monitored anesthesia, the abdomen was prepped and draped in normal sterile fashion. The patient received Ancef pre-incision. Exparel expanded with saline was administered initially as local anesthetic. A local field block was produced along the proposed skin incision on the right lower abdomen. A skin wheal was then raised 1 cm lateral and superior to the anterior superior iliac spine and a  right ilioinguinal nerve block administered. A 8 cm groin crease incision was made and deepened through Martell's fascia. Additional boluses of local anesthetic were given as the dissection proceeded towards the deeper tissues. There was minimal scar tissue present from previous surgery in the right groin. The external abdominal oblique was opened to the internal ring, parallel to its fibers. The external ring was exposed. The right ilioinguinal nerve was identified and protected. Flaps of the external oblique were developed cephalad and inferiorly. The spermatic cord was identified. It was controlled at the external ring using a Penrose drain. It was gently dissected free from the hernia sac, a moderate sized  indirect hernia sac was encountered.   The sac was carefully dissected free down to the level of the internal ring. A finger was passed into the peritoneal cavity and the floor of the inguinal canal was assessed and found to be weak. The femoral canal was palpated and no hernia identified. The sac was suture ligated, amputated and reduced back into the abdomen. Attention was then turned to the floor of the canal, which appeared to be grossly weakened. A 3 x 6 cm piece of Bard Soft Mesh was fashioned with a keyhole slit to accommodate the cord structures. The mesh was secured to the pubic tubercle with 0 PDS. This suture was then secured to the shelving edge of the inguinal ligament and inferior flap of the mesh. Superiorly the mesh was secured to the conjoint tendon with interrupted 0 PDS. Laterally, the tails were approximated and the internal ring was re-created, allowing for passage of the surgeon's fifth fingertip. Hemostasis was checked and satisfactory. The penrose was removed and the cord was then returned to its normal position. The external oblique was closed over it with 3-0 vicryl running suture. Martell's fascia was closed with interrupted 2-0 Vicryl. The dermis was closed with multiple interrupted 3-0 Vicryl sutures. The skin was closed with 4-0 monocryl. Dermabond was applied. The patient tolerated the procedure well. The testicle was reduced into the scrotum. Instrument and sponge counts were correct at the completion of the case.      The patient was delivered to the PACU in stable condition.  I went to discuss the findings of the surgery with the patient's wife in the waiting area.  Ashley Colin MD

## 2018-03-24 NOTE — ANESTHESIA POSTPROCEDURE EVALUATION
Post-Anesthesia Evaluation and Assessment    Patient: Angelina Romano MRN: [id number]  SSN: [id number]    YOB: 1945  Age: 67 y.o. Sex: male       Cardiovascular Function/Vital Signs  Visit Vitals    /86    Pulse 75    Temp 36.7 °C (98 °F)    Resp 16    Ht 6' (1.829 m)    Wt 84.5 kg (186 lb 4.6 oz)    SpO2 98%    BMI 25.27 kg/m2       Patient is status post general anesthesia for Procedure(s):  OPEN HERNIA INGUINAL REPAIR WITH MESH, RIGHT. Nausea/Vomiting: None    Postoperative hydration reviewed and adequate. Pain:  Pain Scale 1: Numeric (0 - 10) (03/23/18 1920)  Pain Intensity 1: 0 (03/23/18 1920)   Managed    Neurological Status:   Neuro (WDL): Exceptions to WDL (03/23/18 1920)  Neuro  Neurologic State: Alert;Eyes open spontaneously (03/23/18 1920)  Orientation Level: Oriented to person;Oriented to place;Oriented to situation (03/23/18 1920)  Cognition: Follows commands (03/23/18 1920)  Speech: Clear (03/23/18 1920)  LUE Motor Response: Purposeful;Spontaneous  (03/23/18 1920)  LLE Motor Response: Purposeful;Spontaneous  (03/23/18 1920)  RUE Motor Response: Purposeful;Spontaneous  (03/23/18 1920)  RLE Motor Response: Purposeful;Spontaneous  (03/23/18 1920)   At baseline    Mental Status and Level of Consciousness: Arousable    Pulmonary Status:   O2 Device: Room air (03/23/18 1920)   Adequate oxygenation and airway patent    Complications related to anesthesia: None    Post-anesthesia assessment completed.  No concerns    Signed By: Carly Su MD     March 23, 2018

## 2018-11-09 ENCOUNTER — HOSPITAL ENCOUNTER (OUTPATIENT)
Dept: CT IMAGING | Age: 73
Discharge: HOME | End: 2018-11-09
Attending: SPECIALIST
Payer: MEDICARE

## 2018-11-09 DIAGNOSIS — J32.9 CHRONIC SINUSITIS: ICD-10-CM

## 2018-11-09 PROCEDURE — 70486 CT MAXILLOFACIAL W/O DYE: CPT

## 2019-07-09 ENCOUNTER — HOSPITAL ENCOUNTER (OUTPATIENT)
Dept: PET IMAGING | Age: 74
Discharge: HOME | End: 2019-07-09
Attending: UROLOGY
Payer: MEDICARE

## 2019-07-09 VITALS — BODY MASS INDEX: 24.38 KG/M2 | HEIGHT: 72 IN | WEIGHT: 180 LBS

## 2019-07-09 DIAGNOSIS — R97.21 RISING PSA FOLLOWING TREATMENT FOR MALIGNANT NEOPLASM OF PROSTATE: ICD-10-CM

## 2019-07-09 DIAGNOSIS — C61 PROSTATE CANCER (HCC): ICD-10-CM

## 2019-07-09 PROCEDURE — A9588 FLUCICLOVINE F-18: HCPCS

## 2019-07-09 PROCEDURE — 78815 PET IMAGE W/CT SKULL-THIGH: CPT

## 2019-07-09 RX ORDER — SODIUM CHLORIDE 0.9 % (FLUSH) 0.9 %
10 SYRINGE (ML) INJECTION
Status: COMPLETED | OUTPATIENT
Start: 2019-07-09 | End: 2019-07-09

## 2019-07-09 RX ADMIN — Medication 10 ML: at 13:51

## 2019-07-17 ENCOUNTER — HOSPITAL ENCOUNTER (OUTPATIENT)
Dept: RADIATION THERAPY | Age: 74
Discharge: HOME | End: 2019-07-17

## 2019-07-25 ENCOUNTER — HOSPITAL ENCOUNTER (OUTPATIENT)
Dept: CT IMAGING | Age: 74
Discharge: HOME | End: 2019-07-25
Attending: RADIOLOGY
Payer: MEDICARE

## 2019-07-25 DIAGNOSIS — C61 PROSTATE CANCER (HCC): ICD-10-CM

## 2019-07-25 PROCEDURE — 74011250636 HC RX REV CODE- 250/636: Performed by: RADIOLOGY

## 2019-07-25 RX ORDER — FENTANYL CITRATE 50 UG/ML
100 INJECTION, SOLUTION INTRAMUSCULAR; INTRAVENOUS
Status: DISCONTINUED | OUTPATIENT
Start: 2019-07-25 | End: 2019-07-29 | Stop reason: HOSPADM

## 2019-07-25 RX ORDER — MIDAZOLAM HYDROCHLORIDE 1 MG/ML
5 INJECTION, SOLUTION INTRAMUSCULAR; INTRAVENOUS
Status: DISCONTINUED | OUTPATIENT
Start: 2019-07-25 | End: 2019-07-29 | Stop reason: HOSPADM

## 2019-07-25 NOTE — PROGRESS NOTES
3130 brought pt to [facility] for scheduled Bone biopsy of Right scapula. Lavina Bravo Dr Tye Babinski reviewing radiology studies. [place] wife back to [facility] discuss procedure with wife and patient and recommends pt get a bone scan for further testing before proceeding with biopsy. Dr Tye Babinski also to discuss this with Dr Frandy Turpin. 3455 Pt dressed and discharged home.

## 2019-08-07 ENCOUNTER — HOSPITAL ENCOUNTER (OUTPATIENT)
Dept: NUCLEAR MEDICINE | Age: 74
Discharge: HOME | End: 2019-08-07
Attending: RADIOLOGY
Payer: MEDICARE

## 2019-08-07 DIAGNOSIS — C61 MALIGNANT NEOPLASM OF PROSTATE (HCC): ICD-10-CM

## 2019-08-07 PROCEDURE — 78306 BONE IMAGING WHOLE BODY: CPT

## 2019-10-17 ENCOUNTER — HOSPITAL ENCOUNTER (OUTPATIENT)
Dept: NUCLEAR MEDICINE | Age: 74
Discharge: HOME | End: 2019-10-17
Attending: RADIOLOGY
Payer: MEDICARE

## 2019-10-17 DIAGNOSIS — C61 PROSTATE CANCER (HCC): ICD-10-CM

## 2019-10-17 PROCEDURE — 78306 BONE IMAGING WHOLE BODY: CPT

## 2019-10-27 ENCOUNTER — HOSPITAL ENCOUNTER (EMERGENCY)
Age: 74
Discharge: HOME | End: 2019-10-27
Attending: EMERGENCY MEDICINE
Payer: MEDICARE

## 2019-10-27 ENCOUNTER — APPOINTMENT (OUTPATIENT)
Dept: CT IMAGING | Age: 74
End: 2019-10-27
Attending: NURSE PRACTITIONER
Payer: MEDICARE

## 2019-10-27 VITALS
BODY MASS INDEX: 23.86 KG/M2 | SYSTOLIC BLOOD PRESSURE: 117 MMHG | HEIGHT: 71 IN | OXYGEN SATURATION: 96 % | RESPIRATION RATE: 18 BRPM | HEART RATE: 60 BPM | DIASTOLIC BLOOD PRESSURE: 55 MMHG | WEIGHT: 170.42 LBS

## 2019-10-27 DIAGNOSIS — R10.9 ABDOMINAL CRAMPING: Primary | ICD-10-CM

## 2019-10-27 LAB
ALBUMIN SERPL-MCNC: 3.8 G/DL (ref 3.5–5)
ALBUMIN/GLOB SERPL: 1.1 {RATIO} (ref 1.1–2.2)
ALP SERPL-CCNC: 94 U/L (ref 45–117)
ALT SERPL-CCNC: 29 U/L (ref 12–78)
ANION GAP SERPL CALC-SCNC: 4 MMOL/L (ref 5–15)
APPEARANCE UR: CLEAR
AST SERPL-CCNC: 23 U/L (ref 15–37)
BACTERIA URNS QL MICRO: NEGATIVE /HPF
BASOPHILS # BLD: 0 K/UL (ref 0–0.1)
BASOPHILS NFR BLD: 0 % (ref 0–1)
BILIRUB SERPL-MCNC: 0.6 MG/DL (ref 0.2–1)
BILIRUB UR QL: NEGATIVE
BUN SERPL-MCNC: 18 MG/DL (ref 6–20)
BUN/CREAT SERPL: 19 (ref 12–20)
CALCIUM SERPL-MCNC: 9.7 MG/DL (ref 8.5–10.1)
CHLORIDE SERPL-SCNC: 104 MMOL/L (ref 97–108)
CO2 SERPL-SCNC: 30 MMOL/L (ref 21–32)
COLOR UR: ABNORMAL
CREAT SERPL-MCNC: 0.94 MG/DL (ref 0.7–1.3)
DIFFERENTIAL METHOD BLD: ABNORMAL
EOSINOPHIL # BLD: 1.1 K/UL (ref 0–0.4)
EOSINOPHIL NFR BLD: 16 % (ref 0–7)
EPITH CASTS URNS QL MICRO: ABNORMAL /LPF
ERYTHROCYTE [DISTWIDTH] IN BLOOD BY AUTOMATED COUNT: 14.4 % (ref 11.5–14.5)
GLOBULIN SER CALC-MCNC: 3.5 G/DL (ref 2–4)
GLUCOSE SERPL-MCNC: 130 MG/DL (ref 65–100)
GLUCOSE UR STRIP.AUTO-MCNC: NEGATIVE MG/DL
HCT VFR BLD AUTO: 40.9 % (ref 36.6–50.3)
HGB BLD-MCNC: 13.9 G/DL (ref 12.1–17)
HGB UR QL STRIP: NEGATIVE
HYALINE CASTS URNS QL MICRO: ABNORMAL /LPF (ref 0–5)
IMM GRANULOCYTES # BLD AUTO: 0 K/UL (ref 0–0.04)
IMM GRANULOCYTES NFR BLD AUTO: 0 % (ref 0–0.5)
KETONES UR QL STRIP.AUTO: NEGATIVE MG/DL
LEUKOCYTE ESTERASE UR QL STRIP.AUTO: NEGATIVE
LYMPHOCYTES # BLD: 0.5 K/UL (ref 0.8–3.5)
LYMPHOCYTES NFR BLD: 7 % (ref 12–49)
MCH RBC QN AUTO: 33 PG (ref 26–34)
MCHC RBC AUTO-ENTMCNC: 34 G/DL (ref 30–36.5)
MCV RBC AUTO: 97.1 FL (ref 80–99)
MONOCYTES # BLD: 0.4 K/UL (ref 0–1)
MONOCYTES NFR BLD: 6 % (ref 5–13)
NEUTS SEG # BLD: 5.1 K/UL (ref 1.8–8)
NEUTS SEG NFR BLD: 71 % (ref 32–75)
NITRITE UR QL STRIP.AUTO: NEGATIVE
NRBC # BLD: 0 K/UL (ref 0–0.01)
NRBC BLD-RTO: 0 PER 100 WBC
PH UR STRIP: 6 [PH] (ref 5–8)
PLATELET # BLD AUTO: 205 K/UL (ref 150–400)
PMV BLD AUTO: 9.5 FL (ref 8.9–12.9)
POTASSIUM SERPL-SCNC: 3.7 MMOL/L (ref 3.5–5.1)
PROT SERPL-MCNC: 7.3 G/DL (ref 6.4–8.2)
PROT UR STRIP-MCNC: 30 MG/DL
RBC # BLD AUTO: 4.21 M/UL (ref 4.1–5.7)
RBC #/AREA URNS HPF: ABNORMAL /HPF (ref 0–5)
RBC MORPH BLD: ABNORMAL
SODIUM SERPL-SCNC: 138 MMOL/L (ref 136–145)
SP GR UR REFRACTOMETRY: 1.02 (ref 1–1.03)
UR CULT HOLD, URHOLD: NORMAL
UROBILINOGEN UR QL STRIP.AUTO: 0.2 EU/DL (ref 0.2–1)
WBC # BLD AUTO: 7.1 K/UL (ref 4.1–11.1)
WBC URNS QL MICRO: ABNORMAL /HPF (ref 0–4)

## 2019-10-27 PROCEDURE — 81001 URINALYSIS AUTO W/SCOPE: CPT

## 2019-10-27 PROCEDURE — 74011636320 HC RX REV CODE- 636/320: Performed by: RADIOLOGY

## 2019-10-27 PROCEDURE — 99284 EMERGENCY DEPT VISIT MOD MDM: CPT

## 2019-10-27 PROCEDURE — 80053 COMPREHEN METABOLIC PANEL: CPT

## 2019-10-27 PROCEDURE — 74177 CT ABD & PELVIS W/CONTRAST: CPT

## 2019-10-27 PROCEDURE — 94762 N-INVAS EAR/PLS OXIMTRY CONT: CPT

## 2019-10-27 PROCEDURE — 74011250636 HC RX REV CODE- 250/636: Performed by: NURSE PRACTITIONER

## 2019-10-27 PROCEDURE — 85025 COMPLETE CBC W/AUTO DIFF WBC: CPT

## 2019-10-27 PROCEDURE — 36415 COLL VENOUS BLD VENIPUNCTURE: CPT

## 2019-10-27 PROCEDURE — 96374 THER/PROPH/DIAG INJ IV PUSH: CPT

## 2019-10-27 RX ORDER — ONDANSETRON 2 MG/ML
4 INJECTION INTRAMUSCULAR; INTRAVENOUS
Status: COMPLETED | OUTPATIENT
Start: 2019-10-27 | End: 2019-10-27

## 2019-10-27 RX ORDER — ONDANSETRON 4 MG/1
4 TABLET, ORALLY DISINTEGRATING ORAL
Qty: 12 TAB | Refills: 0 | Status: SHIPPED | OUTPATIENT
Start: 2019-10-27

## 2019-10-27 RX ADMIN — ONDANSETRON 4 MG: 2 INJECTION INTRAMUSCULAR; INTRAVENOUS at 16:32

## 2019-10-27 RX ADMIN — IOPAMIDOL 100 ML: 755 INJECTION, SOLUTION INTRAVENOUS at 16:54

## 2019-10-27 RX ADMIN — SODIUM CHLORIDE 1000 ML: 900 INJECTION, SOLUTION INTRAVENOUS at 16:34

## 2019-10-27 NOTE — ED NOTES
3:46 PM  I have just evaluated the patient. I have reviewed His vital signs and determined there is currently no worsening in their condition or physical exam. I have talked with the patient and the family and advised them that I am the provider in triage and have ordered lab work, x rays and other diagnostic tests. I have advised them that we will try and get them to the back as soon as possible. I have also advised them that should they have a worsening condition or any problems before they are sent back to the main ED, to contact me or the triage nurse. Initial Complaint: Abd pain, blurry vision,     H/O Colon, prostate and rectal CA     Just finished radiation of the prostate Wednesday. Started: Wednesday    Endorses: bloody diarrhea, blurry vision, abd pain with burning sensation, dizzy, mild nausea. Denies: dysuria,     Last weight 167 at radiation. Made better: nothing  Made worse: nothing    No further complaints. Orders placed.      Primary care provider: MD Hussain Sung NP

## 2019-10-27 NOTE — ED PROVIDER NOTES
Date of Service:  10/27/2019    Patient:  Belle Woodard    Chief Complaint:  Dizziness and Abdominal Pain    HPI:  Belle Woodard is a 75 y.o. male who presents for evaluation of abdominal pain. Patient states earlier today while driving he experienced sudden onset abdominal pain and blurred vision. Patient reports he has associated dizziness with this episode. Patient states the past 2 nights he has had increased diarrhea with sudden, frequent urges to go. Patient states he has not had any BMs today due to an steroid he started taking. Patient states he feels better now from his earlier episode, but with the symptoms he has been having over the past days he decided to come to the ED. Patient notes he recently finished chemoradiation 4 days ago (Wednesday) to treat his cancer, with another round of radiation on his shoulder tomorrow from cancer that spread from his prostate. Patient denies any vomiting, chest pain, SOB, headache, dysuria. Note written by Kayley Martin, as dictated by Nayely Mendoza DO 4:24 PM       The history is provided by the patient and the spouse. No  was used. Past Medical History:   Diagnosis Date    Colon cancer ([address]     GERD (gastroesophageal reflux disease)     Hypertension     Ill-defined condition     prostate c ancer       Past Surgical History:   Procedure Laterality Date    HX APPENDECTOMY      HX COLECTOMY Right 2014    HX COLONOSCOPY  2014    HX HERNIA REPAIR      HX KNEE ARTHROSCOPY Bilateral     HX ORTHOPAEDIC Left     hip replaced    HX OTHER SURGICAL      benign tumor removal from back         No family history on file.     Social History     Socioeconomic History    Marital status:      Spouse name: Not on file    Number of children: Not on file    Years of education: Not on file    Highest education level: Not on file   Occupational History    Not on file   Social Needs    Financial resource strain: Not on file    Food insecurity:     Worry: Not on file     Inability: Not on file    Transportation needs:     Medical: Not on file     Non-medical: Not on file   Tobacco Use    Smoking status: Never Smoker    Smokeless tobacco: Never Used   Substance and Sexual Activity    Alcohol use: No    Drug use: No    Sexual activity: Not on file   Lifestyle    Physical activity:     Days per week: Not on file     Minutes per session: Not on file    Stress: Not on file   Relationships    Social connections:     Talks on phone: Not on file     Gets together: Not on file     Attends Sabianism service: Not on file     Active member of club or organization: Not on file     Attends meetings of clubs or organizations: Not on file     Relationship status: Not on file    Intimate partner violence:     Fear of current or ex partner: Not on file     Emotionally abused: Not on file     Physically abused: Not on file     Forced sexual activity: Not on file   Other Topics Concern    Not on file   Social History Narrative    Not on file         ALLERGIES: Patient has no known allergies. Review of Systems   Constitutional: Negative for fever. HENT: Negative for hearing loss. Eyes: Positive for visual disturbance. Respiratory: Negative for shortness of breath. Cardiovascular: Negative for chest pain. Gastrointestinal: Positive for abdominal pain, diarrhea and nausea. Negative for vomiting. Genitourinary: Negative for flank pain. Musculoskeletal: Negative for back pain. Skin: Negative for rash. Neurological: Positive for dizziness. Negative for light-headedness. Psychiatric/Behavioral: Negative for suicidal ideas. Vitals:    10/27/19 1551 10/27/19 1641 10/27/19 1645   BP: 117/66  117/55   Pulse: 60     Resp: 18     SpO2: 96% 96% 96%   Weight: 77.3 kg (170 lb 6.7 oz)     Height: 5' 11\" (1.803 m)              Physical Exam   Constitutional: He is oriented to person, place, and time.  He appears well-developed and well-nourished. No distress. HENT:   Head: Normocephalic and atraumatic. Mouth/Throat: Oropharynx is clear and moist.   Eyes: Pupils are equal, round, and reactive to light. Conjunctivae and EOM are normal.   Neck: Neck supple. No JVD present. No tracheal deviation present. Cardiovascular: Normal rate and regular rhythm. Exam reveals no friction rub. Murmur heard. Systolic murmur is present with a grade of 2/6. Pulmonary/Chest: Effort normal and breath sounds normal. No respiratory distress. Abdominal: Soft. He exhibits no distension. There is no tenderness. Musculoskeletal: He exhibits no edema, tenderness or deformity. Neurological: He is alert and oriented to person, place, and time. Skin: Skin is warm and dry. Capillary refill takes less than 2 seconds. No rash noted. Psychiatric: He has a normal mood and affect.  His behavior is normal. Judgment and thought content normal.    Note written by Kayley Nieves, as dictated by No att. providers found 4:24 PM       MDM  Number of Diagnoses or Management Options  Abdominal cramping:     ED Course as of Oct 27 2018   Sun Oct 27, 2019   1726 CT ABDOMEN PELVIS WITH CONTRAST [GG]      ED Course User Index  [GG] Maurilio Slaughter DO     VITAL SIGNS:  Patient Vitals for the past 4 hrs:   BP SpO2   10/27/19 1645 117/55 96 %   10/27/19 1641  96 %         LABS:  Recent Results (from the past 6 hour(s))   CBC WITH AUTOMATED DIFF    Collection Time: 10/27/19  3:59 PM   Result Value Ref Range    WBC 7.1 4.1 - 11.1 K/uL    RBC 4.21 4.10 - 5.70 M/uL    HGB 13.9 12.1 - 17.0 g/dL    HCT 40.9 36.6 - 50.3 %    MCV 97.1 80.0 - 99.0 FL    MCH 33.0 26.0 - 34.0 PG    MCHC 34.0 30.0 - 36.5 g/dL    RDW 14.4 11.5 - 14.5 %    PLATELET 488 803 - 806 K/uL    MPV 9.5 8.9 - 12.9 FL    NRBC 0.0 0  WBC    ABSOLUTE NRBC 0.00 0.00 - 0.01 K/uL    NEUTROPHILS 71 32 - 75 %    LYMPHOCYTES 7 (L) 12 - 49 %    MONOCYTES 6 5 - 13 %    EOSINOPHILS 16 (H) 0 - 7 % BASOPHILS 0 0 - 1 %    IMMATURE GRANULOCYTES 0 0.0 - 0.5 %    ABS. NEUTROPHILS 5.1 1.8 - 8.0 K/UL    ABS. LYMPHOCYTES 0.5 (L) 0.8 - 3.5 K/UL    ABS. MONOCYTES 0.4 0.0 - 1.0 K/UL    ABS. EOSINOPHILS 1.1 (H) 0.0 - 0.4 K/UL    ABS. BASOPHILS 0.0 0.0 - 0.1 K/UL    ABS. IMM. GRANS. 0.0 0.00 - 0.04 K/UL    DF AUTOMATED      RBC COMMENTS NORMOCYTIC, NORMOCHROMIC     METABOLIC PANEL, COMPREHENSIVE    Collection Time: 10/27/19  3:59 PM   Result Value Ref Range    Sodium 138 136 - 145 mmol/L    Potassium 3.7 3.5 - 5.1 mmol/L    Chloride 104 97 - 108 mmol/L    CO2 30 21 - 32 mmol/L    Anion gap 4 (L) 5 - 15 mmol/L    Glucose 130 (H) 65 - 100 mg/dL    BUN 18 6 - 20 MG/DL    Creatinine 0.94 0.70 - 1.30 MG/DL    BUN/Creatinine ratio 19 12 - 20      GFR est AA >60 >60 ml/min/1.73m2    GFR est non-AA >60 >60 ml/min/1.73m2    Calcium 9.7 8.5 - 10.1 MG/DL    Bilirubin, total 0.6 0.2 - 1.0 MG/DL    ALT (SGPT) 29 12 - 78 U/L    AST (SGOT) 23 15 - 37 U/L    Alk. phosphatase 94 45 - 117 U/L    Protein, total 7.3 6.4 - 8.2 g/dL    Albumin 3.8 3.5 - 5.0 g/dL    Globulin 3.5 2.0 - 4.0 g/dL    A-G Ratio 1.1 1.1 - 2.2     URINALYSIS W/MICROSCOPIC    Collection Time: 10/27/19  4:30 PM   Result Value Ref Range    Color YELLOW/STRAW      Appearance CLEAR CLEAR      Specific gravity 1.019 1.003 - 1.030      pH (UA) 6.0 5.0 - 8.0      Protein 30 (A) NEG mg/dL    Glucose NEGATIVE  NEG mg/dL    Ketone NEGATIVE  NEG mg/dL    Bilirubin NEGATIVE  NEG      Blood NEGATIVE  NEG      Urobilinogen 0.2 0.2 - 1.0 EU/dL    Nitrites NEGATIVE  NEG      Leukocyte Esterase NEGATIVE  NEG      WBC 0-4 0 - 4 /hpf    RBC 0-5 0 - 5 /hpf    Epithelial cells FEW FEW /lpf    Bacteria NEGATIVE  NEG /hpf    Hyaline cast 2-5 0 - 5 /lpf   URINE CULTURE HOLD SAMPLE    Collection Time: 10/27/19  4:30 PM   Result Value Ref Range    Urine culture hold        URINE ON HOLD IN MICROBIOLOGY DEPT FOR 3 DAYS.  IF UNPRESERVED URINE IS SUBMITTED, IT CANNOT BE USED FOR ADDITIONAL TESTING AFTER 24 HRS, RECOLLECTION WILL BE REQUIRED. IMAGING:  CT ABD PELV W CONT   Final Result   IMPRESSION:      1. No evidence of acute process in the abdomen or pelvis. 2. Small sclerotic lesions in the right iliac bone and right S1 body, which were   not avid on recently performed bone scan, and may be benign. Attention on   follow-up. Otherwise no evidence of metastatic disease in the abdomen or pelvis. 3. Additional findings as above. Medications During Visit:  Medications   sodium chloride 0.9 % bolus infusion 1,000 mL (0 mL IntraVENous IV Completed 10/27/19 1750)   ondansetron (ZOFRAN) injection 4 mg (4 mg IntraVENous Given 10/27/19 1632)   iopamidol (ISOVUE-370) 76 % injection 100 mL (100 mL IntraVENous Given 10/27/19 9304)         DECISION MAKING:  Adore Angeles is a 75 y.o. male who comes in as above. Here, patient had no complaints of acute issues. He states the abdominal pain stopped earlier. The blurry vision is episodic and only related to his abdominal pain. He has not had any additional episodes. Here imaging and labs are on comfort and is excessive bowel movements may be related to the chemoradiation. At this time he is hemodynamically signs of distress. He has been seen up walking around the room. He feels comfortable to follow-up with his oncologist who he sees tomorrow. As he has no neurologic deficits on exam and no reproducible discomfort and reassuring exam otherwise I feel safe to discharge him home. All questions have been answered. Disposition the patient is hemodynamically stable. IMPRESSION:  1.  Abdominal cramping        DISPOSITION:  Discharged      Discharge Medication List as of 10/27/2019  5:40 PM      START taking these medications    Details   ondansetron (ZOFRAN ODT) 4 mg disintegrating tablet Take 1 Tab by mouth every eight (8) hours as needed for Nausea., Normal, Disp-12 Tab, R-0         CONTINUE these medications which have NOT CHANGED Details   oxyCODONE-acetaminophen (PERCOCET) 5-325 mg per tablet Take 1-2 Tabs by mouth every four (4) hours as needed for Pain. Max Daily Amount: 12 Tabs., Print, Disp-25 Tab, R-0      LEUPROLIDE ACETATE (LUPRON DEPOT, 3 MONTH, IM) by IntraMUSCular route., Historical Med      nebivolol (BYSTOLIC) 20 mg tablet Take  by mouth daily. , Historical Med      alendronate (FOSAMAX) 35 mg tablet Take 35 mg by mouth every seven (7) days. , Historical Med      cyanocobalamin (VITAMIN B-12) 1,000 mcg/mL injection 1,000 mcg by IntraMUSCular route once. Takes once per month, Historical Med      Calcium-Cholecalciferol, D3, 500 mg(1,250mg) -400 unit tab Take  by mouth daily. , Historical Med      valsartan-hydrochlorothiazide (DIOVAN HCT) 160-12.5 mg per tablet Take 1 Tab by mouth daily. , Historical Med      omeprazole (PRILOSEC OTC) 20 mg tablet Take 20 mg by mouth daily. , Historical Med              Follow-up Information     Follow up With Specialties Details Why Contact Info    Micheal Clark MD [facility] Schedule an appointment as soon as possible for a visit   [facility] [address] [phone]              The patient is asked to follow-up with their primary care provider in the next several days. They are to call tomorrow for an appointment. The patient is asked to return promptly for any increased concerns or worsening of symptoms. They can return to this emergency department or any other emergency department.     Procedures  PROGRESS NOTE:  5:39 PM  Patient reports to feeling good

## 2019-10-27 NOTE — ED TRIAGE NOTES
Patient here with dizziness and abd pain with \" some rectal bleeding, which is natural\". Patient with hx of rectal, prostrate, colon cancer. Patient just finished radiation to abd area 5 days ago.

## 2020-02-05 ENCOUNTER — HOSPITAL ENCOUNTER (OUTPATIENT)
Dept: RADIATION THERAPY | Age: 75
Discharge: HOME | End: 2020-02-05

## 2020-08-06 ENCOUNTER — HOSPITAL ENCOUNTER (OUTPATIENT)
Dept: RADIATION THERAPY | Age: 75
Discharge: HOME | End: 2020-08-06

## 2020-08-06 VITALS — BODY MASS INDEX: 25.77 KG/M2 | WEIGHT: 180 LBS | HEIGHT: 70 IN

## 2020-08-20 ENCOUNTER — HOSPITAL ENCOUNTER (OUTPATIENT)
Dept: PET IMAGING | Age: 75
Discharge: HOME | End: 2020-08-20
Attending: INTERNAL MEDICINE
Payer: MEDICARE

## 2020-08-20 VITALS — BODY MASS INDEX: 25.2 KG/M2 | HEIGHT: 71 IN | WEIGHT: 180 LBS

## 2020-08-20 DIAGNOSIS — C61 MALIGNANT NEOPLASM OF PROSTATE (HCC): ICD-10-CM

## 2020-08-20 PROCEDURE — 78815 PET IMAGE W/CT SKULL-THIGH: CPT

## 2020-08-20 RX ORDER — SODIUM CHLORIDE 0.9 % (FLUSH) 0.9 %
10 SYRINGE (ML) INJECTION
Status: COMPLETED | OUTPATIENT
Start: 2020-08-20 | End: 2020-08-20

## 2020-08-20 RX ADMIN — Medication 10 ML: at 13:35

## 2021-04-07 ENCOUNTER — TRANSCRIBE ORDER (OUTPATIENT)
Dept: SCHEDULING | Age: 76
End: 2021-04-07

## 2021-04-07 DIAGNOSIS — C61 MALIGNANT NEOPLASM OF PROSTATE (HCC): Primary | ICD-10-CM

## 2021-04-21 ENCOUNTER — HOSPITAL ENCOUNTER (OUTPATIENT)
Dept: PET IMAGING | Age: 76
Discharge: HOME | End: 2021-04-21
Attending: INTERNAL MEDICINE
Payer: MEDICARE

## 2021-04-21 DIAGNOSIS — C61 MALIGNANT NEOPLASM OF PROSTATE (HCC): ICD-10-CM

## 2021-04-21 PROCEDURE — 78815 PET IMAGE W/CT SKULL-THIGH: CPT

## 2021-04-21 RX ORDER — SODIUM CHLORIDE 0.9 % (FLUSH) 0.9 %
10 SYRINGE (ML) INJECTION
Status: COMPLETED | OUTPATIENT
Start: 2021-04-21 | End: 2021-04-21

## 2021-04-21 RX ADMIN — Medication 10 ML: at 13:42

## 2022-03-15 ENCOUNTER — TRANSCRIBE ORDER (OUTPATIENT)
Dept: SCHEDULING | Age: 77
End: 2022-03-15

## 2022-03-15 DIAGNOSIS — R31.9 HEMATURIA SYNDROME: ICD-10-CM

## 2022-03-15 DIAGNOSIS — Z51.11 ENCOUNTER FOR ANTINEOPLASTIC CHEMOTHERAPY: ICD-10-CM

## 2022-03-15 DIAGNOSIS — C61 MALIGNANT NEOPLASM OF PROSTATE (HCC): Primary | ICD-10-CM

## 2022-03-15 DIAGNOSIS — Z51.89 ENCOUNTER FOR VOCATIONAL THERAPY: ICD-10-CM

## 2022-04-19 ENCOUNTER — HOSPITAL ENCOUNTER (OUTPATIENT)
Dept: PET IMAGING | Age: 77
Discharge: HOME | End: 2022-04-19
Attending: INTERNAL MEDICINE
Payer: MEDICARE

## 2022-04-19 VITALS — HEIGHT: 71 IN | WEIGHT: 175 LBS | BODY MASS INDEX: 24.5 KG/M2

## 2022-04-19 DIAGNOSIS — C61 MALIGNANT NEOPLASM OF PROSTATE (HCC): ICD-10-CM

## 2022-04-19 DIAGNOSIS — Z51.11 ENCOUNTER FOR ANTINEOPLASTIC CHEMOTHERAPY: ICD-10-CM

## 2022-04-19 DIAGNOSIS — Z51.89 ENCOUNTER FOR VOCATIONAL THERAPY: ICD-10-CM

## 2022-04-19 DIAGNOSIS — R31.9 HEMATURIA SYNDROME: ICD-10-CM

## 2022-04-19 PROCEDURE — 78815 PET IMAGE W/CT SKULL-THIGH: CPT

## 2022-06-26 ENCOUNTER — APPOINTMENT (OUTPATIENT)
Dept: GENERAL RADIOLOGY | Age: 77
End: 2022-06-26
Attending: STUDENT IN AN ORGANIZED HEALTH CARE EDUCATION/TRAINING PROGRAM
Payer: MEDICARE

## 2022-06-26 ENCOUNTER — HOSPITAL ENCOUNTER (EMERGENCY)
Age: 77
Discharge: HOME | End: 2022-06-26
Attending: STUDENT IN AN ORGANIZED HEALTH CARE EDUCATION/TRAINING PROGRAM
Payer: MEDICARE

## 2022-06-26 VITALS
HEIGHT: 72 IN | RESPIRATION RATE: 16 BRPM | OXYGEN SATURATION: 98 % | DIASTOLIC BLOOD PRESSURE: 93 MMHG | BODY MASS INDEX: 23.7 KG/M2 | SYSTOLIC BLOOD PRESSURE: 161 MMHG | WEIGHT: 175 LBS | TEMPERATURE: 98.2 F | HEART RATE: 85 BPM

## 2022-06-26 DIAGNOSIS — M54.50 CHRONIC BILATERAL LOW BACK PAIN WITHOUT SCIATICA: Primary | ICD-10-CM

## 2022-06-26 DIAGNOSIS — G89.29 CHRONIC BILATERAL LOW BACK PAIN WITHOUT SCIATICA: Primary | ICD-10-CM

## 2022-06-26 PROCEDURE — 72100 X-RAY EXAM L-S SPINE 2/3 VWS: CPT

## 2022-06-26 PROCEDURE — 74011250637 HC RX REV CODE- 250/637: Performed by: STUDENT IN AN ORGANIZED HEALTH CARE EDUCATION/TRAINING PROGRAM

## 2022-06-26 PROCEDURE — 99283 EMERGENCY DEPT VISIT LOW MDM: CPT

## 2022-06-26 RX ORDER — CYCLOBENZAPRINE HCL 10 MG
10 TABLET ORAL
Qty: 15 TABLET | Refills: 0 | Status: SHIPPED | OUTPATIENT
Start: 2022-06-26 | End: 2022-07-01

## 2022-06-26 RX ORDER — CYCLOBENZAPRINE HCL 10 MG
5 TABLET ORAL
Status: COMPLETED | OUTPATIENT
Start: 2022-06-26 | End: 2022-06-26

## 2022-06-26 RX ADMIN — CYCLOBENZAPRINE 5 MG: 10 TABLET, FILM COATED ORAL at 03:56

## 2022-06-26 NOTE — ED TRIAGE NOTES
Patient arrives ambulatory to ED with complaints of lower back pain, recent fall, patient took 2-500 mg tylenol at 2130    Currently getting chemo for prostate cancer, treatment every 3 weeks with next treatment due on Monday

## 2022-06-29 NOTE — ED PROVIDER NOTES
Patient is a 58-year-old male presented emergency department complaining of lower back pain. Patient states that he had a fall recently denies any LOC denies any lower extremity weakness numbness or tingling also denies any urinary incontinence or saddle anesthesia. Patient states that he had taken to 500 mg Tylenol earlier this evening but he states that the pain started to increase and got as high as an 8/10 told his wife that he thought he needed to come the emergency department to get it \"checked out\". Patient is able to ambulate without difficulty denies any chest pain or shortness of breath. Patient is currently receiving chemotherapy treatment for prostate cancer he receives treatment every 3 weeks. Past Medical History:   Diagnosis Date    Colon cancer ([address]     GERD (gastroesophageal reflux disease)     Hypertension     Ill-defined condition     prostate c ancer       Past Surgical History:   Procedure Laterality Date    HX APPENDECTOMY      HX COLONOSCOPY  2014    HX HERNIA REPAIR      HX KNEE ARTHROSCOPY Bilateral     HX ORTHOPAEDIC Left     hip replaced    HX OTHER SURGICAL      benign tumor removal from back    HX TOTAL COLECTOMY Right 2014         No family history on file.     Social History     Socioeconomic History    Marital status:      Spouse name: Not on file    Number of children: Not on file    Years of education: Not on file    Highest education level: Not on file   Occupational History    Not on file   Tobacco Use    Smoking status: Never Smoker    Smokeless tobacco: Never Used   Substance and Sexual Activity    Alcohol use: No    Drug use: No    Sexual activity: Not on file   Other Topics Concern    Not on file   Social History Narrative    Not on file     Social Determinants of Health     Financial Resource Strain:     Difficulty of Paying Living Expenses: Not on file   Food Insecurity:     Worried About Running Out of Food in the Last Year: Not on file    [address] in the Last Year: Not on file   Transportation Needs:     Lack of Transportation (Medical): Not on file    Lack of Transportation (Non-Medical): Not on file   Physical Activity:     Days of Exercise per Week: Not on file    Minutes of Exercise per Session: Not on file   Stress:     Feeling of Stress : Not on file   Social Connections:     Frequency of Communication with Friends and Family: Not on file    Frequency of Social Gatherings with Friends and Family: Not on file    Attends [employer] Services: Not on file    Active Member of [address] [place] [address] [employer] or Organizations: Not on file    Attends Club or Organization Meetings: Not on file    Marital Status: Not on file   Intimate Partner Violence:     Fear of Current or Ex-Partner: Not on file    Emotionally Abused: Not on file    Physically Abused: Not on file    Sexually Abused: Not on file   Housing Stability:     Unable to Pay for Housing in the Last Year: Not on file    Number of [place] in the Last Year: Not on file    Unstable Housing in the Last Year: Not on file         ALLERGIES: Patient has no known allergies. Review of Systems   Musculoskeletal: Positive for back pain. All other systems reviewed and are negative. Vitals:    06/26/22 0032 06/26/22 0403   BP: (!) 169/99 (!) 161/93   Pulse: 99 85   Resp: 17 16   Temp: 98.2 °F (36.8 °C)    SpO2: 98% 98%   Weight: 79.4 kg (175 lb)    Height: 6' (1.829 m)             Physical Exam  Vitals and nursing note reviewed. Constitutional:       Appearance: Normal appearance. HENT:      Head: Normocephalic and atraumatic. Eyes:      Extraocular Movements: Extraocular movements intact. Pupils: Pupils are equal, round, and reactive to light. Cardiovascular:      Rate and Rhythm: Normal rate and regular rhythm. Pulses: Normal pulses. Heart sounds: Normal heart sounds.    Pulmonary:      Effort: Pulmonary effort is normal.      Breath sounds: Normal breath sounds. Abdominal:      General: Abdomen is flat. Palpations: Abdomen is soft. Musculoskeletal:      Cervical back: Normal range of motion and neck supple. Lumbar back: Tenderness and bony tenderness present. No lacerations. Decreased range of motion. Negative right straight leg raise test and negative left straight leg raise test. No scoliosis. Skin:     General: Skin is warm and dry. Neurological:      General: No focal deficit present. Mental Status: He is alert and oriented to person, place, and time. Psychiatric:         Mood and Affect: Mood normal.         Behavior: Behavior normal.          MDM  Number of Diagnoses or Management Options  Chronic bilateral low back pain without sciatica  Diagnosis management comments: Lumbar back strain, occult fracture. 18-year-old male presented emergency department for lower lumbar back pain midline no step-off concern for fracture this patient is currently undergoing chemotherapy treatment for prostate cancer. X-ray reassuring will give patient Flexeril male for symptoms.          Procedures

## 2022-09-15 ENCOUNTER — TRANSCRIBE ORDER (OUTPATIENT)
Dept: SCHEDULING | Age: 77
End: 2022-09-15

## 2022-09-15 DIAGNOSIS — C61 MALIGNANT NEOPLASM OF PROSTATE (HCC): Primary | ICD-10-CM

## 2022-09-15 DIAGNOSIS — Z51.11 ENCOUNTER FOR ANTINEOPLASTIC CHEMOTHERAPY: ICD-10-CM

## 2022-09-15 DIAGNOSIS — R97.21 INCREASING PSA LEVEL AFTER TREATMENT FOR PROSTATE CANCER: ICD-10-CM

## 2022-10-07 ENCOUNTER — HOSPITAL ENCOUNTER (OUTPATIENT)
Dept: PET IMAGING | Age: 77
Discharge: HOME | End: 2022-10-07
Attending: INTERNAL MEDICINE
Payer: MEDICARE

## 2022-10-07 VITALS — WEIGHT: 167 LBS | HEIGHT: 72 IN | BODY MASS INDEX: 22.62 KG/M2

## 2022-10-07 DIAGNOSIS — Z51.11 ENCOUNTER FOR ANTINEOPLASTIC CHEMOTHERAPY: ICD-10-CM

## 2022-10-07 DIAGNOSIS — C61 MALIGNANT NEOPLASM OF PROSTATE (HCC): ICD-10-CM

## 2022-10-07 DIAGNOSIS — R97.21 INCREASING PSA LEVEL AFTER TREATMENT FOR PROSTATE CANCER: ICD-10-CM

## 2022-10-07 PROCEDURE — 78815 PET IMAGE W/CT SKULL-THIGH: CPT

## (undated) DEVICE — KENDALL SCD EXPRESS SLEEVES, KNEE LENGTH, MEDIUM: Brand: KENDALL SCD

## (undated) DEVICE — INTENDED FOR TISSUE SEPARATION, AND OTHER PROCEDURES THAT REQUIRE A SHARP SURGICAL BLADE TO PUNCTURE OR CUT.: Brand: BARD-PARKER ® CARBON RIB-BACK BLADES

## (undated) DEVICE — DBD-PACK,LAPAROTOMY,2 REINFORCED GOWNS: Brand: MEDLINE

## (undated) DEVICE — SURGICAL PROCEDURE PACK BASIN MAJ SET CUST NO CAUT

## (undated) DEVICE — SPONGE: SPECIALTY PEANUT XR 100/CS: Brand: MEDICAL ACTION INDUSTRIES

## (undated) DEVICE — DEVON™ KNEE AND BODY STRAP 60" X 3" (1.5 M X 7.6 CM): Brand: DEVON

## (undated) DEVICE — SYR 10ML LUER LOK 1/5ML GRAD --

## (undated) DEVICE — SUTURE MCRYL SZ 4-0 L27IN ABSRB UD L19MM PS-2 1/2 CIR PRIM Y426H

## (undated) DEVICE — NEEDLE HYPO 22GA L1.5IN BLK S STL HUB POLYPR SHLD REG BVL

## (undated) DEVICE — GOWN,SIRUS,NONRNF,SETINSLV,XL,20/CS: Brand: MEDLINE

## (undated) DEVICE — DRAIN WND PENRS RADPQ 0.5X18IN --

## (undated) DEVICE — (D)PREP SKN CHLRAPRP APPL 26ML -- CONVERT TO ITEM 371833

## (undated) DEVICE — LIGHT HANDLE: Brand: DEVON

## (undated) DEVICE — STERILE POLYISOPRENE POWDER-FREE SURGICAL GLOVES: Brand: PROTEXIS

## (undated) DEVICE — 3000CC GUARDIAN II: Brand: GUARDIAN

## (undated) DEVICE — REM POLYHESIVE ADULT PATIENT RETURN ELECTRODE: Brand: VALLEYLAB

## (undated) DEVICE — SOL IRRIGATION INJ NACL 0.9% 500ML BTL

## (undated) DEVICE — SUTURE VCRL SZ 3-0 L27IN ABSRB UD L26MM SH 1/2 CIR J416H

## (undated) DEVICE — DERMABOND SKIN ADH 0.7ML -- DERMABOND ADVANCED 12/BX

## (undated) DEVICE — SUTURE VCRL + SZ 2-0 L54IN ABSRB VLT TIE POLYGLACTIN 910 VCP286G

## (undated) DEVICE — INFECTION CONTROL KIT SYS

## (undated) DEVICE — SUTURE VCRL SZ 2-0 L27IN ABSRB UD L26MM SH 1/2 CIR J417H

## (undated) DEVICE — TOWEL SURG W17XL27IN STD BLU COT NONFENESTRATED PREWASHED

## (undated) DEVICE — DEVICE TRNSF SPIK STL 2008S] MICROTEK MEDICAL INC]

## (undated) DEVICE — DRAPE,REIN 53X77,STERILE: Brand: MEDLINE

## (undated) DEVICE — SUTURE PDS II SZ 0 L27IN ABSRB VLT L26MM CT-2 1/2 CIR Z334H

## (undated) DEVICE — STERILE POLYISOPRENE POWDER-FREE SURGICAL GLOVES WITH EMOLLIENT COATING: Brand: PROTEXIS

## (undated) DEVICE — ROCKER SWITCH PENCIL BLADE ELECTRODE, HOLSTER: Brand: EDGE